# Patient Record
Sex: FEMALE | Employment: FULL TIME | URBAN - METROPOLITAN AREA
[De-identification: names, ages, dates, MRNs, and addresses within clinical notes are randomized per-mention and may not be internally consistent; named-entity substitution may affect disease eponyms.]

---

## 2018-07-25 ENCOUNTER — OFFICE VISIT (OUTPATIENT)
Dept: FAMILY MEDICINE | Facility: CLINIC | Age: 43
End: 2018-07-25
Payer: COMMERCIAL

## 2018-07-25 VITALS
WEIGHT: 188 LBS | DIASTOLIC BLOOD PRESSURE: 69 MMHG | TEMPERATURE: 98.5 F | OXYGEN SATURATION: 100 % | SYSTOLIC BLOOD PRESSURE: 113 MMHG | HEART RATE: 87 BPM

## 2018-07-25 DIAGNOSIS — J01.90 ACUTE SINUSITIS WITH SYMPTOMS GREATER THAN 10 DAYS: Primary | ICD-10-CM

## 2018-07-25 DIAGNOSIS — J30.2 ACUTE SEASONAL ALLERGIC RHINITIS, UNSPECIFIED TRIGGER: ICD-10-CM

## 2018-07-25 DIAGNOSIS — T75.3XXA MOTION SICKNESS, INITIAL ENCOUNTER: ICD-10-CM

## 2018-07-25 LAB
DEPRECATED S PYO AG THROAT QL EIA: NORMAL
SPECIMEN SOURCE: NORMAL

## 2018-07-25 PROCEDURE — 87081 CULTURE SCREEN ONLY: CPT | Performed by: NURSE PRACTITIONER

## 2018-07-25 PROCEDURE — 99203 OFFICE O/P NEW LOW 30 MIN: CPT | Performed by: NURSE PRACTITIONER

## 2018-07-25 PROCEDURE — 87880 STREP A ASSAY W/OPTIC: CPT | Performed by: NURSE PRACTITIONER

## 2018-07-25 RX ORDER — SCOLOPAMINE TRANSDERMAL SYSTEM 1 MG/1
PATCH, EXTENDED RELEASE TRANSDERMAL
Qty: 5 PATCH | Refills: 0 | Status: SHIPPED | OUTPATIENT
Start: 2018-07-25

## 2018-07-25 ASSESSMENT — PAIN SCALES - GENERAL: PAINLEVEL: EXTREME PAIN (8)

## 2018-07-25 NOTE — MR AVS SNAPSHOT
After Visit Summary   7/25/2018    hCelsi Trujillo    MRN: [id number]           Patient Information     Date Of Birth          1975        Visit Information        Provider Department      7/25/2018 9:20 AM Savanna Zhang APRN CNP [facility]        Today's Diagnoses     Acute sinusitis with symptoms greater than 10 days    -  1    Acute seasonal allergic rhinitis, unspecified trigger        Motion sickness, initial encounter          Care Instructions    Push fluids, rest  Start antibiotic today - augmentin 1 tab every 12 hours x10 days  Tylenol/ibuprofen as needed for pain/fever  If worsening or not improving, follow up with primary care provider in 5 days, sooner if needed      At [facility], we strive to deliver an exceptional experience to you, every time we see you.  If you receive a survey in the mail, please send us back your thoughts. We really do value your feedback.    Based on your medical history, these are the current health maintenance/preventive care services that you are due for (some may have been done at this visit.)  Health Maintenance Due   Topic Date Due     PHQ-2 Q1 YR  05/19/1988     TETANUS IMMUNIZATION (SYSTEM ASSIGNED)  05/19/1994     HIV SCREEN (SYSTEM ASSIGNED)  05/19/1994     PAP SCREENING Q3 YR (SYSTEM ASSIGNED)  05/19/1997         Suggested websites for health information:  Www.[employer] : Up to date and easily searchable information on multiple topics.  Www.medlineplus.gov : medication info, interactive tutorials, watch real surgeries online  Www.familydoctor.org : good info from the Academy of Family Physicians  Www.cdc.gov : public health info, travel advisories, epidemics (H1N1)  Www.aap.org : children's health info, normal development, vaccinations  Www.health.state.[place].us : [place] dept of health, public health issues in [place], N1N1    Your care team:                            Family Medicine Internal Medicine    MD Marco Griffin MD Shantel Branch-Fleming, MD Katya Georgiev PA-C Nam Ho, MD Pediatrics   SKY Orozco, NOBLE Sousa APRN MD Arleth Mckinney MD Deborah Mielke, MD Kim Thein, APR[facility]      Clinic hours: Monday - Thursday 7 am-7 pm; Fridays 7 am-5 pm.   Urgent care: Monday - Friday 11 am-9 pm; Saturday and Sunday 9 am-5 pm.  Pharmacy : Monday -Thursday 8 am-8 pm; Friday 8 am-6 pm; Saturday and Sunday 9 am-5 pm.     Clinic: [phone]   Pharmacy: [phone]    Acute Sinusitis    Acute sinusitis is irritation and swelling of the sinuses. It is usually caused by a viral infection after a common cold. Your doctor can help you find relief.  What is acute sinusitis?  Sinuses are air-filled spaces in the skull behind the face. They are kept moist and clean by a lining of mucosa. Things such as pollen, smoke, and chemical fumes can irritate the mucosa. It can then swell up. As a response to irritation, the mucosa makes more mucus and other fluids. Tiny hairlike cilia cover the mucosa. Cilia help carry mucus toward the opening of the sinus. Too much mucus may cause the cilia to stop working. This blocks the sinus opening. A buildup of fluid in the sinuses then causes pain and pressure. It can also encourage bacteria to grow in the sinuses.  Common symptoms of acute sinusitis  You may have:    Facial soreness pain    Headache    Fever    Fluid draining in the back of the throat (postnasal drip)    Congestion    Drainage that is thick and colored, instead of clear    Cough  Diagnosing acute sinusitis  Your doctor will ask about your symptoms and health history. He or she will look at your ear, nose, and throat. You usually won't need to have X-rays taken.    The doctor may take a sample of mucus to check for bacteria. If you have sinusitis that keeps coming back, you may need imaging tests such as X-rays or CAT scans. This will help your  "doctor check for a structural problem that may be causing the infection.  Treating acute sinusitis  Treatment is aimed at unblocking the sinus opening and helping the cilia work again. You may need to take antihistamine and decongestant medicine. These can reduce inflammation and decrease the amount of fluid your sinuses make. If you have a bacterial infection, you will need to take antibiotic medicine for 10 to 14 days. Take this medicine until it is gone, even if you feel better.  Date Last Reviewed: 10/1/2016    2008-2742 [employer]. [address] [place] [address]. All rights reserved. This information is not intended as a substitute for professional medical care. Always follow your healthcare professional's instructions.                Follow-ups after your visit        Who to contact     If you have questions or need follow up information about today's clinic visit or your schedule please contact [facility] directly at [phone].  Normal or non-critical lab and imaging results will be communicated to you by MyChart, letter or phone within 4 business days after the clinic has received the results. If you do not hear from us within 7 days, please contact the clinic through [employer] or phone. If you have a critical or abnormal lab result, we will notify you by phone as soon as possible.  Submit refill requests through [email or web address] or call your pharmacy and they will forward the refill request to us. Please allow 3 business days for your refill to be completed.          Additional Information About Your Visit        [email or web address] Information     [email or web address] lets you send messages to your doctor, view your test results, renew your prescriptions, schedule appointments and more. To sign up, go to [email or web address] . Click on \"Log in\" on the left side of the screen, which will take you to the Welcome page. Then click on \"Sign up Now\" on the right side of the page.     You will be " asked to enter the access code listed below, as well as some personal information. Please follow the directions to create your username and password.     Your access code is: [email or web address]  Expires: 10/23/2018  9:58 AM     Your access code will  in 90 days. If you need help or a new code, please call your [place] clinic or [phone].        Care EveryWhere ID     This is your Care EveryWhere ID. This could be used by other organizations to access your [place] medical records  [id number]        Your Vitals Were     Pulse Temperature Pulse Oximetry             87 98.5  F (36.9  C) (Oral) 100%          Blood Pressure from Last 3 Encounters:   18 113/69    Weight from Last 3 Encounters:   18 188 lb (85.3 kg)              We Performed the Following     Beta strep group A culture     Rapid strep screen          Today's Medication Changes          These changes are accurate as of 18 10:04 AM.  If you have any questions, ask your nurse or doctor.               Start taking these medicines.        Dose/Directions    amoxicillin-clavulanate 875-125 MG per tablet   Commonly known as:  AUGMENTIN   Used for:  Acute sinusitis with symptoms greater than 10 days        Dose:  1 tablet   Take 1 tablet by mouth 2 times daily   Quantity:  20 tablet   Refills:  0       scopolamine 72 hr patch   Commonly known as:  TRANSDERM   Used for:  Motion sickness, initial encounter        Apply 1 patch to hairless area behind one ear at least 4 hours before travel.  Remove old patch and change every 3 days (72 hours).   Quantity:  5 patch   Refills:  0         These medicines have changed or have updated prescriptions.        Dose/Directions    loratadine-pseudoePHEDrine 5-120 MG per 12 hr tablet   Commonly known as:  CLARITIN-D 12-hour   This may have changed:    - how much to take  - how to take this  - when to take this   Used for:  Acute seasonal allergic rhinitis, unspecified trigger        Dose:  1 tablet   Take  1 tablet by mouth 2 times daily   Quantity:  60 tablet   Refills:  3            Where to get your medicines      These medications were sent to [id number] - [place], [place] - [address]  [address] [place] [address]     Phone:  [phone]     amoxicillin-clavulanate 875-125 MG per tablet    scopolamine 72 hr patch         Some of these will need a paper prescription and others can be bought over the counter.  Ask your nurse if you have questions.     Bring a paper prescription for each of these medications     loratadine-pseudoePHEDrine 5-120 MG per 12 hr tablet                Primary Care Provider Office Phone # Fax #    Savanna Zhang, PAYTON [phone]       [address]  [facility] [address]        Equal Access to Services     MAURI BROOKE : Hadii lesley cooley hadasho Soomaali, waaxda luqadaha, qaybta kaalmada adeegyada, stevan eli . So [facility] [phone].    ATENCIÓN: Si habla español, tiene a cano disposición servicios gratuitos de asistencia lingüística. Llame al [phone].    We comply with applicable federal civil rights laws and [place] laws. We do not discriminate on the basis of race, color, national origin, age, disability, sex, sexual orientation, or gender identity.            Thank you!     Thank you for choosing [facility]  for your care. Our goal is always to provide you with excellent care. Hearing back from our patients is one way we can continue to improve our services. Please take a few minutes to complete the written survey that you may receive in the mail after your visit with us. Thank you!             Your Updated Medication List - Protect others around you: Learn how to safely use, store and throw away your medicines at www.disposemymeds.org.          This list is accurate as of 7/25/18 10:04 AM.  Always use your most recent med list.                   Brand Name Dispense Instructions  for use Diagnosis    amoxicillin-clavulanate 875-125 MG per tablet    AUGMENTIN    20 tablet    Take 1 tablet by mouth 2 times daily    Acute sinusitis with symptoms greater than 10 days       loratadine-pseudoePHEDrine 5-120 MG per 12 hr tablet    CLARITIN-D 12-hour    60 tablet    Take 1 tablet by mouth 2 times daily    Acute seasonal allergic rhinitis, unspecified trigger       scopolamine 72 hr patch    TRANSDERM    5 patch    Apply 1 patch to hairless area behind one ear at least 4 hours before travel.  Remove old patch and change every 3 days (72 hours).    Motion sickness, initial encounter

## 2018-07-25 NOTE — Clinical Note
Please abstract the following data from this visit with this patient into the appropriate field in Epic:  Pap smear done on this date: 2016 (approximately), by this group: [facility], results were normal.
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
## 2018-07-26 LAB
BACTERIA SPEC CULT: NORMAL
SPECIMEN SOURCE: NORMAL

## 2018-11-29 ENCOUNTER — OFFICE VISIT (OUTPATIENT)
Dept: FAMILY MEDICINE | Facility: CLINIC | Age: 43
End: 2018-11-29
Payer: COMMERCIAL

## 2018-11-29 VITALS
DIASTOLIC BLOOD PRESSURE: 78 MMHG | OXYGEN SATURATION: 99 % | HEART RATE: 94 BPM | RESPIRATION RATE: 16 BRPM | TEMPERATURE: 98.4 F | SYSTOLIC BLOOD PRESSURE: 112 MMHG | WEIGHT: 188 LBS

## 2018-11-29 DIAGNOSIS — T78.40XA ALLERGIC REACTION, INITIAL ENCOUNTER: Primary | ICD-10-CM

## 2018-11-29 PROCEDURE — 99213 OFFICE O/P EST LOW 20 MIN: CPT | Performed by: FAMILY MEDICINE

## 2018-11-29 RX ORDER — PREDNISONE 20 MG/1
40 TABLET ORAL DAILY
Qty: 10 TABLET | Refills: 0 | Status: SHIPPED | OUTPATIENT
Start: 2018-11-29 | End: 2018-12-04

## 2018-11-29 ASSESSMENT — PAIN SCALES - GENERAL: PAINLEVEL: NO PAIN (0)

## 2018-11-29 NOTE — PATIENT INSTRUCTIONS
First Aid: Allergic Reactions  Limited reaction  A limited (localized) reaction affects only the area of contact. Some reactions may not show up for days. Others can occur almost right away.  Step 1. Stop the source    If the person has been stung, scrape the stinger away with the edge of a credit card or the dull edge of a knife. Don t use fingers or tweezers to remove a stinger. If pinched, the stinger may empty its venom into the skin.    If the reaction is caused by eating a specific food or taking a medicine, the person should not eat or take the substance again.  Step 2. Treat skin irritation    Wash insect bites with soap and water.    Remove and wash in hot water all clothing that may have plant oils or any other substance that has caused a reaction on them. Shower with plenty of soap to wash any plant oils or other allergens off the skin.    Ask your healthcare provider how to control itchy or irritated skin.  Severe reaction  A severe (systemic) reaction affects the entire body. In extreme cases, the airways from mouth to lungs may swell (anaphylaxis). The reaction may happen right away or over several hours.  Step 1. Calm the person    Help the person into a comfortable position. Prop up his or her head to help with breathing.    Tell the person to remain still and limit talking.    If the person carries medicine (epinephrine) to control anaphylaxis, help him or her use it.    Prevent any further contact with or exposure to allergen.   Step 2. Monitor breathing    Watch for signs of airway swelling such as wheezing or swollen lips. With an extreme reaction, the person may have trouble getting any breath.    Do rescue breathing, if needed. In extreme cases, you may not be able to get air into the lungs.  Call 911  Call 911 right away if the person has any of the following:    Trouble breathing    A history of airway swelling (anaphylaxis)  While you wait for help:    Reassure the person.    Treat for  shock or provide rescue breathing or CPR, if needed.   When to seek medical help  An allergic reaction may become more serious over time. Seek medical help if any of the following is true:    A rash or hives covers the face, genitals, or most of the body.    An entire body part, such as an arm or leg, swells.    The tongue or lips begin to swell.   Date Last Reviewed: 12/1/2016 2000-2018 [employer]. [address] [place] [address]. All rights reserved. This information is not intended as a substitute for professional medical care. Always follow your healthcare professional's instructions.
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
## 2018-11-29 NOTE — MR AVS SNAPSHOT
After Visit Summary   11/29/2018    Chelsi Trujillo    MRN: [id number]           Patient Information     Date Of Birth          1975        Visit Information        Provider Department      11/29/2018 1:00 PM Mallory Ackerman MD [facility]        Today's Diagnoses     Allergic reaction, initial encounter    -  1      Care Instructions      First Aid: Allergic Reactions  Limited reaction  A limited (localized) reaction affects only the area of contact. Some reactions may not show up for days. Others can occur almost right away.  Step 1. Stop the source    If the person has been stung, scrape the stinger away with the edge of a credit card or the dull edge of a knife. Don t use fingers or tweezers to remove a stinger. If pinched, the stinger may empty its venom into the skin.    If the reaction is caused by eating a specific food or taking a medicine, the person should not eat or take the substance again.  Step 2. Treat skin irritation    Wash insect bites with soap and water.    Remove and wash in hot water all clothing that may have plant oils or any other substance that has caused a reaction on them. Shower with plenty of soap to wash any plant oils or other allergens off the skin.    Ask your healthcare provider how to control itchy or irritated skin.  Severe reaction  A severe (systemic) reaction affects the entire body. In extreme cases, the airways from mouth to lungs may swell (anaphylaxis). The reaction may happen right away or over several hours.  Step 1. Calm the person    Help the person into a comfortable position. Prop up his or her head to help with breathing.    Tell the person to remain still and limit talking.    If the person carries medicine (epinephrine) to control anaphylaxis, help him or her use it.    Prevent any further contact with or exposure to allergen.   Step 2. Monitor breathing    Watch for signs of airway swelling such as wheezing  or swollen lips. With an extreme reaction, the person may have trouble getting any breath.    Do rescue breathing, if needed. In extreme cases, you may not be able to get air into the lungs.  Call 911  Call 911 right away if the person has any of the following:    Trouble breathing    A history of airway swelling (anaphylaxis)  While you wait for help:    Reassure the person.    Treat for shock or provide rescue breathing or CPR, if needed.   When to seek medical help  An allergic reaction may become more serious over time. Seek medical help if any of the following is true:    A rash or hives covers the face, genitals, or most of the body.    An entire body part, such as an arm or leg, swells.    The tongue or lips begin to swell.   Date Last Reviewed: 12/1/2016 2000-2018 [employer]. [address] [place] [address]. All rights reserved. This information is not intended as a substitute for professional medical care. Always follow your healthcare professional's instructions.                Follow-ups after your visit        Who to contact     If you have questions or need follow up information about today's clinic visit or your schedule please contact [facility] directly at [phone].  Normal or non-critical lab and imaging results will be communicated to you by TicketBoxhart, letter or phone within 4 business days after the clinic has received the results. If you do not hear from us within 7 days, please contact the clinic through TicketBoxhart or phone. If you have a critical or abnormal lab result, we will notify you by phone as soon as possible.  Submit refill requests through bewarket or call your pharmacy and they will forward the refill request to us. Please allow 3 business days for your refill to be completed.          Additional Information About Your Visit        bewarket Information     bewarket lets you send messages to your doctor, view your test results, renew your  "prescriptions, schedule appointments and more. To sign up, go to [email or web address] . Click on \"Log in\" on the left side of the screen, which will take you to the Welcome page. Then click on \"Sign up Now\" on the right side of the page.     You will be asked to enter the access code listed below, as well as some personal information. Please follow the directions to create your username and password.     Your access code is: [email or web address]  Expires: 2019  1:25 PM     Your access code will  in 90 days. If you need help or a new code, please call your [facility] clinic or [phone].        Care EveryWhere ID     This is your Care EveryWhere ID. This could be used by other organizations to access your [facility] medical records  [id number]        Your Vitals Were     Pulse Temperature Respirations Pulse Oximetry          94 98.4  F (36.9  C) (Oral) 16 99%         Blood Pressure from Last 3 Encounters:   18 112/78   18 113/69    Weight from Last 3 Encounters:   18 188 lb (85.3 kg)   18 188 lb (85.3 kg)              Today, you had the following     No orders found for display         Today's Medication Changes          These changes are accurate as of 18  1:25 PM.  If you have any questions, ask your nurse or doctor.               Start taking these medicines.        Dose/Directions    predniSONE 20 MG tablet   Commonly known as:  DELTASONE   Used for:  Allergic reaction, initial encounter   Started by:  Mallory Ackerman MD        Dose:  40 mg   Take 2 tablets (40 mg) by mouth daily for 5 days   Quantity:  10 tablet   Refills:  0            Where to get your medicines      These medications were sent to [id number] [place], [place] - [address]  [address] [address]     Phone:  [phone]     predniSONE 20 MG tablet         Some of these will need a paper prescription and others can be bought over the counter.  Ask your " nurse if you have questions.     Bring a paper prescription for each of these medications     loratadine-pseudoePHEDrine 5-120 MG 12 hr tablet                Primary Care Provider Office Phone # Fax #    PAYTON Oliveira [phone]       [address]  [facility] [address]        Equal Access to Services     MAURI BROOKE : Hadii aad ku hadasho Soomaali, waaxda luqadaha, qaybta kaalmada adeegyada, waxay idiin hayaan adeeg kharash la'alexandria . So [facility] [phone].    ATENCIÓN: Si habla español, tiene a cano disposición servicios gratuitos de asistencia lingüística. Llame al [phone].    We comply with applicable federal civil rights laws and [place] laws. We do not discriminate on the basis of race, color, national origin, age, disability, sex, sexual orientation, or gender identity.            Thank you!     Thank you for choosing [facility]  for your care. Our goal is always to provide you with excellent care. Hearing back from our patients is one way we can continue to improve our services. Please take a few minutes to complete the written survey that you may receive in the mail after your visit with us. Thank you!             Your Updated Medication List - Protect others around you: Learn how to safely use, store and throw away your medicines at www.disposemymeds.org.          This list is accurate as of 11/29/18  1:25 PM.  Always use your most recent med list.                   Brand Name Dispense Instructions for use Diagnosis    loratadine-pseudoePHEDrine 5-120 MG 12 hr tablet    CLARITIN-D 12-HOUR    60 tablet    Take 1 tablet by mouth 2 times daily    Allergic reaction, initial encounter       predniSONE 20 MG tablet    DELTASONE    10 tablet    Take 2 tablets (40 mg) by mouth daily for 5 days    Allergic reaction, initial encounter       scopolamine 1 MG/3DAYS 72 hr patch    TRANSDERM    5 patch    Apply 1 patch to hairless area behind one ear at least 4  hours before travel.  Remove old patch and change every 3 days (72 hours).    Motion sickness, initial encounter

## 2019-01-30 ENCOUNTER — TELEPHONE (OUTPATIENT)
Dept: FAMILY MEDICINE | Facility: CLINIC | Age: 44
End: 2019-01-30

## 2019-01-30 ENCOUNTER — OFFICE VISIT (OUTPATIENT)
Dept: FAMILY MEDICINE | Facility: CLINIC | Age: 44
End: 2019-01-30
Payer: COMMERCIAL

## 2019-01-30 VITALS
SYSTOLIC BLOOD PRESSURE: 122 MMHG | HEART RATE: 95 BPM | TEMPERATURE: 98.6 F | OXYGEN SATURATION: 98 % | DIASTOLIC BLOOD PRESSURE: 76 MMHG | WEIGHT: 187 LBS | RESPIRATION RATE: 16 BRPM

## 2019-01-30 DIAGNOSIS — T78.40XA ALLERGIC REACTION, INITIAL ENCOUNTER: Primary | ICD-10-CM

## 2019-01-30 PROCEDURE — 99213 OFFICE O/P EST LOW 20 MIN: CPT | Performed by: NURSE PRACTITIONER

## 2019-01-30 RX ORDER — PREDNISONE 10 MG/1
TABLET ORAL
Qty: 14 TABLET | Refills: 0 | Status: SHIPPED | OUTPATIENT
Start: 2019-01-30

## 2019-01-30 ASSESSMENT — PAIN SCALES - GENERAL: PAINLEVEL: NO PAIN (0)

## 2019-01-30 NOTE — TELEPHONE ENCOUNTER
Called the patient and she has had this reaction in the past. She is noting that prednisone helped with this last. Informed that she will need to be seen for assessment and treatment. Patient is scheduled for today in clinic for assessment.    Isabelle Ceja RN, [facility]

## 2019-01-30 NOTE — TELEPHONE ENCOUNTER
Reason for Call:  Other call back    Detailed comments: Chelsi is having some sort of allergic reaction. Not sure what is triggering it but her eyes are very itchy and watery.  They were very red and super swollen this morning almost swollen shut.  Please call to advise.  Thank you     Phone Number Patient can be reached at: Home number on file [phone] (home)    Best Time: Any    Can we leave a detailed message on this number? YES    Call taken on 1/30/2019 at 11:41 AM by Jovany Decker
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
